# Patient Record
Sex: MALE | Race: WHITE | NOT HISPANIC OR LATINO | URBAN - METROPOLITAN AREA
[De-identification: names, ages, dates, MRNs, and addresses within clinical notes are randomized per-mention and may not be internally consistent; named-entity substitution may affect disease eponyms.]

---

## 2023-10-12 ENCOUNTER — TELEPHONE (OUTPATIENT)
Dept: VASCULAR SURGERY | Facility: CLINIC | Age: 78
End: 2023-10-12

## 2023-10-12 NOTE — TELEPHONE ENCOUNTER
Left message for the pt to offer clinic appt for OCT. 25 @ 11pm with Dr. NESHA Betancur. Sent confirmation to Mary Bear with Dr. TOSIN Pavon [facility] at the [facility]. She is sending the pt disk.

## 2023-10-23 DIAGNOSIS — I71.23 ANEURYSM OF DESCENDING THORACIC AORTA WITHOUT RUPTURE: Primary | ICD-10-CM

## 2023-10-23 DIAGNOSIS — I77.1 STRICTURE OF ARTERY: ICD-10-CM

## 2023-10-25 ENCOUNTER — HOSPITAL ENCOUNTER (OUTPATIENT)
Dept: VASCULAR SURGERY | Facility: CLINIC | Age: 78
Discharge: HOME | End: 2023-10-25
Attending: SURGERY
Payer: MEDICARE

## 2023-10-25 ENCOUNTER — INITIAL CONSULT (OUTPATIENT)
Dept: VASCULAR SURGERY | Facility: CLINIC | Age: 78
End: 2023-10-25
Payer: MEDICARE

## 2023-10-25 VITALS
HEIGHT: 69 IN | BODY MASS INDEX: 26.77 KG/M2 | DIASTOLIC BLOOD PRESSURE: 78 MMHG | TEMPERATURE: 98 F | HEART RATE: 73 BPM | WEIGHT: 180.75 LBS | SYSTOLIC BLOOD PRESSURE: 149 MMHG

## 2023-10-25 DIAGNOSIS — I65.23 BILATERAL CAROTID ARTERY STENOSIS: Primary | ICD-10-CM

## 2023-10-25 DIAGNOSIS — I73.9 PAD (PERIPHERAL ARTERY DISEASE): Primary | ICD-10-CM

## 2023-10-25 DIAGNOSIS — I71.23 ANEURYSM OF DESCENDING THORACIC AORTA WITHOUT RUPTURE: ICD-10-CM

## 2023-10-25 DIAGNOSIS — I71.23 ANEURYSM OF DESCENDING THORACIC AORTA WITHOUT RUPTURE: Primary | ICD-10-CM

## 2023-10-25 DIAGNOSIS — I77.1 STRICTURE OF ARTERY: ICD-10-CM

## 2023-10-25 PROCEDURE — 93925 LOWER EXTREMITY STUDY: CPT | Mod: 52,PBBFAC | Performed by: SURGERY

## 2023-10-25 PROCEDURE — 93923 UPR/LXTR ART STDY 3+ LVLS: CPT | Mod: 26,S$PBB,, | Performed by: SURGERY

## 2023-10-25 PROCEDURE — 93880 EXTRACRANIAL BILAT STUDY: CPT | Mod: PBBFAC | Performed by: SURGERY

## 2023-10-25 PROCEDURE — 93880 PR DUPLEX SCAN EXTRACRANIAL,BILAT: ICD-10-PCS | Mod: 26,S$PBB,, | Performed by: SURGERY

## 2023-10-25 PROCEDURE — 93923 PR NON-INVASIVE PHYSIOLOGIC STUDY EXTREMITY 3 LEVELS: ICD-10-PCS | Mod: 26,S$PBB,, | Performed by: SURGERY

## 2023-10-25 PROCEDURE — 99213 OFFICE O/P EST LOW 20 MIN: CPT | Mod: PBBFAC | Performed by: SURGERY

## 2023-10-25 PROCEDURE — 99999 PR PBB SHADOW E&M-EST. PATIENT-LVL III: CPT | Mod: PBBFAC,,, | Performed by: SURGERY

## 2023-10-25 PROCEDURE — 99205 PR OFFICE/OUTPT VISIT, NEW, LEVL V, 60-74 MIN: ICD-10-PCS | Mod: S$PBB,,, | Performed by: SURGERY

## 2023-10-25 PROCEDURE — 93880 EXTRACRANIAL BILAT STUDY: CPT | Mod: 26,S$PBB,, | Performed by: SURGERY

## 2023-10-25 PROCEDURE — 93925 LOWER EXTREMITY STUDY: CPT | Mod: 26,52,S$PBB, | Performed by: SURGERY

## 2023-10-25 PROCEDURE — 93923 UPR/LXTR ART STDY 3+ LVLS: CPT | Mod: PBBFAC | Performed by: SURGERY

## 2023-10-25 PROCEDURE — 99999 PR PBB SHADOW E&M-EST. PATIENT-LVL III: ICD-10-PCS | Mod: PBBFAC,,, | Performed by: SURGERY

## 2023-10-25 PROCEDURE — 93925 PR DUPLEX LO EXTREM ART BILAT: ICD-10-PCS | Mod: 26,52,S$PBB, | Performed by: SURGERY

## 2023-10-25 PROCEDURE — 99205 OFFICE O/P NEW HI 60 MIN: CPT | Mod: S$PBB,,, | Performed by: SURGERY

## 2023-10-25 RX ORDER — ZINC GLUCONATE 50 MG
50 TABLET ORAL DAILY
COMMUNITY

## 2023-10-25 RX ORDER — CLOPIDOGREL BISULFATE 75 MG/1
75 TABLET ORAL
COMMUNITY
Start: 2023-09-18

## 2023-10-25 RX ORDER — ASPIRIN 81 MG/1
81 TABLET ORAL DAILY
COMMUNITY

## 2023-10-25 RX ORDER — AMLODIPINE BESYLATE 5 MG/1
5 TABLET ORAL
COMMUNITY
Start: 2023-07-26

## 2023-10-25 RX ORDER — ROSUVASTATIN CALCIUM 40 MG/1
40 TABLET, COATED ORAL
COMMUNITY
Start: 2023-10-02

## 2023-10-25 RX ORDER — METOPROLOL TARTRATE 25 MG/1
25 TABLET, FILM COATED ORAL
COMMUNITY
Start: 2023-07-26

## 2023-10-25 RX ORDER — CHOLECALCIFEROL (VITAMIN D3) 25 MCG
1000 TABLET ORAL DAILY
COMMUNITY

## 2023-10-25 RX ORDER — POTASSIUM CHLORIDE 750 MG/1
10 TABLET, EXTENDED RELEASE ORAL ONCE
COMMUNITY

## 2023-10-25 RX ORDER — LOSARTAN POTASSIUM AND HYDROCHLOROTHIAZIDE 25; 100 MG/1; MG/1
1 TABLET ORAL
COMMUNITY
Start: 2023-08-21

## 2023-10-25 NOTE — PROGRESS NOTES
Chucky Daniels  10/25/2023    HPI:  Patient is a 77 y.o. male with a h/o extensive tobacco use (>50 pack yrs), HTN, HLD and known PAD s/p R LACIE and EIA PTAS ([place], [place]) who is here today for evaluation of a mid-thoracic aortic aneurysm - found after screening CT in 2018. He was being followed by [facility] in [place], [place] and PMD found recent growth to 6cm TAA.  Per wife, when R LACIE, EIA PTAS was done, the L iliac was attempted by could not be crossed.    Able to walk 1 flight of stairs without any angina or PRESSLEY - limited by hip OA    No MI/stroke  Tobacco use: > 50 pack; quit 2008  FHx for aneurysmal disease: father Dx age 88 yo    Asked to see by Dr Jordan Montana - [facility] in [place], [place]  PMD is Dr JUDITH Mcmahan, Primary Doctor   Employment: Ret[employer]      Current Outpatient Medications:     amLODIPine (NORVASC) 5 MG tablet, Take 5 mg by mouth., Disp: , Rfl:     aspirin (ECOTRIN) 81 MG EC tablet, Take 81 mg by mouth once daily., Disp: , Rfl:     berberine/herbal complex no.18 (BERBERINE-HERBAL COMB NO.18 ORAL), Take by mouth., Disp: , Rfl:     clopidogreL (PLAVIX) 75 mg tablet, Take 75 mg by mouth., Disp: , Rfl:     IRON HEME POLYPEPTIDE ORAL, Take by mouth., Disp: , Rfl:     Lactobac no.41/Bifidobact no.7 (PROBIOTIC-10 ORAL), Take by mouth., Disp: , Rfl:     LIDOcaine HCL-menthoL 4-1 % Crea, Apply topically., Disp: , Rfl:     losartan-hydrochlorothiazide 100-25 mg (HYZAAR) 100-25 mg per tablet, Take 1 tablet by mouth., Disp: , Rfl:     metoprolol tartrate (LOPRESSOR) 25 MG tablet, Take 25 mg by mouth., Disp: , Rfl:     multivitamin capsule, Take 1 capsule by mouth once daily., Disp: , Rfl:     potassium chloride (KLOR-CON) 10 MEQ TbSR, Take 10 mEq by mouth once., Disp: , Rfl:     rosuvastatin (CRESTOR) 40 MG Tab, Take 40 mg by mouth., Disp: , Rfl:     vit A/vit C/vit E/zinc/copper (ICAPS AREDS ORAL), Take by mouth., Disp: , Rfl:     vitamin D (VITAMIN D3) 1000 units Tab, Take 1,000 Units by  "mouth once daily., Disp: , Rfl:     zinc gluconate 50 mg tablet, Take 50 mg by mouth once daily., Disp: , Rfl:     PHYSICAL EXAM:   Right Arm BP - Sittin/78 (10/25/23 1019)  Left Arm BP - Sittin/80 (10/25/23 1019)  Pulse: 73  Temp: 98.1 °F (36.7 °C)                   Neck: No carotid bruit can be auscultated          Abdomen: Soft, nontender, nondistended, no masses or organomegaly     No rebound tenderness noted; bowel sounds normal     Pulsatile aortic mass is not palpable.     No groin adenopathy      Extremities:   2+ R; no L femoral pulses bilaterally     2+ R DP pulses palpable; not on L foot     No pedal edema; No ulcerations    LAB RESULTS:  No results found for: "K", "CREATININE"  No results found for: "WBC", "HCT", "PLT"  No results found for: "HGBA1C"    IMAGING (I have independently reviewed and interpreted the following tests):  Outside CT ch/a/p (in epic): mid-thoracic aortic aneurysm, 6cm  Extensive calcification aorta into iliacs x2  Evidence of prior R LACIE and EIA stents - both have lumens 5mm    ABIs  R 1.04  L 0.59    Popliteal u/s: no aneurysms    Carotid u/s < 39% stenoses x2    IMP/PLAN:     77 y.o. male with a h/o extensive tobacco use (>50 pack yrs), HTN, HLD and known PAD s/p R LACIE and EIA PTAS ([place], [place]) with a 6cm mid-thoracic aneurysm and heavily calcified aorto-iliac arteries    Outside CT ch/a/p (in epic): mid-thoracic aortic aneurysm, 6cm  Extensive calcification aorta into iliacs x2  Evidence of prior R LACIE and EIA stents - both have lumens 5mm and the R EIA stent appears to be self-expanding, precluding further luminal gain with aggressive PTA/S  I spent > 1.5hrs looking at the CT to determine ways to be able to deliver a thoracic stent graft device for repair of the 6cm TAA. I even contemplated going thru the L axillary or L CCA - retrograde - but vessel size is not large enough, arch is heavily calcified increasing risk of stroke.  Further, the new [place] C-Tag " opens from tip to hub (50%) and then hub to tip -- no longer from mid-section.  Hence, there is no option for a TEVAR.    Perhaps in the future, there may be improvement in an available TEVAR with markedly lower profile to allow passage of 31 - 32mm TEVAR through a 5.5mm R LACIE/EIA lumen  Should it grow fast and > 7.5cm, we would then entertain open repair with atrio-femoral bypass -- but he agrees to not do this at this time.  F/u in 1 yr with new CT chest/abd/pelvis        Jose Juan Betancur MD DFS FACS   Vascular/Endovascular Surgery    Time spent personally reviewing the patient's chart, interpreting images and test, and conferring with physicians was HBtimespent2: 60 mins.

## 2024-10-15 DIAGNOSIS — I71.23 ANEURYSM OF DESCENDING THORACIC AORTA WITHOUT RUPTURE: Primary | ICD-10-CM

## 2024-12-04 ENCOUNTER — OFFICE VISIT (OUTPATIENT)
Dept: VASCULAR SURGERY | Facility: CLINIC | Age: 79
End: 2024-12-04
Payer: MEDICARE

## 2024-12-04 ENCOUNTER — HOSPITAL ENCOUNTER (OUTPATIENT)
Dept: RADIOLOGY | Facility: HOSPITAL | Age: 79
Discharge: HOME | End: 2024-12-04
Attending: SURGERY
Payer: MEDICARE

## 2024-12-04 VITALS
SYSTOLIC BLOOD PRESSURE: 166 MMHG | DIASTOLIC BLOOD PRESSURE: 74 MMHG | HEIGHT: 69 IN | BODY MASS INDEX: 24.82 KG/M2 | HEART RATE: 76 BPM | TEMPERATURE: 98 F | WEIGHT: 167.56 LBS

## 2024-12-04 DIAGNOSIS — I71.23 ANEURYSM OF DESCENDING THORACIC AORTA WITHOUT RUPTURE: Primary | ICD-10-CM

## 2024-12-04 DIAGNOSIS — I73.9 PAD (PERIPHERAL ARTERY DISEASE): ICD-10-CM

## 2024-12-04 DIAGNOSIS — I71.23 ANEURYSM OF DESCENDING THORACIC AORTA WITHOUT RUPTURE: ICD-10-CM

## 2024-12-04 PROCEDURE — 99999 PR PBB SHADOW E&M-EST. PATIENT-LVL III: CPT | Mod: PBBFAC,,, | Performed by: SURGERY

## 2024-12-04 PROCEDURE — 74176 CT ABD & PELVIS W/O CONTRAST: CPT | Mod: 26,,, | Performed by: RADIOLOGY

## 2024-12-04 PROCEDURE — 71250 CT THORAX DX C-: CPT | Mod: 26,,, | Performed by: RADIOLOGY

## 2024-12-04 PROCEDURE — 74176 CT ABD & PELVIS W/O CONTRAST: CPT | Mod: TC

## 2024-12-04 PROCEDURE — 99214 OFFICE O/P EST MOD 30 MIN: CPT | Mod: S$PBB,,, | Performed by: SURGERY

## 2024-12-04 PROCEDURE — 99213 OFFICE O/P EST LOW 20 MIN: CPT | Mod: PBBFAC,25 | Performed by: SURGERY

## 2024-12-04 NOTE — PROGRESS NOTES
Chucky Daniels  12/04/2024    HPI:  Patient is a 78 y.o. male with a h/o extensive tobacco use (>50 pack yrs), HTN, HLD and known PAD s/p R LACIE and EIA PTAS ([place], [place]) who is here today for follow up of PAD and a mid-thoracic aortic aneurysm - found after screening CT in 2018. He was being followed by [facility] in [place], [place] and PMD found recent growth to 6cm TAA.  Per wife, when R LACIE, EIA PTAS was done, the L iliac was attempted by could not be crossed.    Able to walk 1 flight of stairs without any angina or PRESSLEY - limited by hip OA. He has been going to the gym and walking on the treadmill. He walks for 20 minutes total, going for 2 minutes at a time.    No MI/stroke  Tobacco use: > 50 pack; quit 2008  FHx for aneurysmal disease: father Dx age 90 yo    Asked to see by Dr Jordan Montana - [facility] in [place], [place]  PMD is Dr JUDITH Mcmahan, Primary Doctor   Employment: Ret[employer]      Current Outpatient Medications:     amLODIPine (NORVASC) 5 MG tablet, Take 5 mg by mouth., Disp: , Rfl:     aspirin (ECOTRIN) 81 MG EC tablet, Take 81 mg by mouth once daily., Disp: , Rfl:     berberine/herbal complex no.18 (BERBERINE-HERBAL COMB NO.18 ORAL), Take by mouth., Disp: , Rfl:     clopidogreL (PLAVIX) 75 mg tablet, Take 75 mg by mouth., Disp: , Rfl:     IRON HEME POLYPEPTIDE ORAL, Take by mouth., Disp: , Rfl:     Lactobac no.41/Bifidobact no.7 (PROBIOTIC-10 ORAL), Take by mouth., Disp: , Rfl:     LIDOcaine HCL-menthoL 4-1 % Crea, Apply topically., Disp: , Rfl:     losartan-hydrochlorothiazide 100-25 mg (HYZAAR) 100-25 mg per tablet, Take 1 tablet by mouth., Disp: , Rfl:     metoprolol tartrate (LOPRESSOR) 25 MG tablet, Take 25 mg by mouth., Disp: , Rfl:     multivitamin capsule, Take 1 capsule by mouth once daily., Disp: , Rfl:     potassium chloride (KLOR-CON) 10 MEQ TbSR, Take 10 mEq by mouth once., Disp: , Rfl:     rosuvastatin (CRESTOR) 40 MG Tab, Take 40 mg by mouth., Disp: , Rfl:     vit A/vit C/vit  "E/zinc/copper (ICAPS AREDS ORAL), Take by mouth., Disp: , Rfl:     vitamin D (VITAMIN D3) 1000 units Tab, Take 1,000 Units by mouth once daily., Disp: , Rfl:     zinc gluconate 50 mg tablet, Take 50 mg by mouth once daily., Disp: , Rfl:     PHYSICAL EXAM:   Right Arm BP - Sittin/74 (24 1347)  Left Arm BP - Sittin/80 (24 1347)  Pulse: 76  Temp: 98.1 °F (36.7 °C)                   Neck: No carotid bruit can be auscultated          Abdomen: Soft, nontender, nondistended, no masses or organomegaly     No rebound tenderness noted; bowel sounds normal     Pulsatile aortic mass is not palpable.     No groin adenopathy      Extremities:   2+ R; no L femoral pulses bilaterally     2+ R DP pulses palpable; not on L foot     No pedal edema; No ulcerations    LAB RESULTS:  No results found for: "K", "CREATININE"  No results found for: "WBC", "HCT", "PLT"  Lab Results   Component Value Date    HGBA1C 5.6 10/04/2023    HGBA1C 5.9 05/15/2023    HGBA1C 5.4 10/27/2022       IMAGING (I have independently reviewed and interpreted the following tests):    Dec 2024, CT chest/a/p (no contrast): 6.1cm mid-thoracic aneurysm    Prior:  , Outside CT ch/a/p (in epic): mid-thoracic aortic aneurysm, 6cm  Extensive calcification aorta into iliacs x2  Evidence of prior R LACIE and EIA stents - both have lumens 5mm    ABIs  R 1.04  L 0.59    Popliteal u/s: no aneurysms    Carotid u/s < 39% stenoses x2    IMP/PLAN:     78 y.o. male with a h/o extensive tobacco use (>50 pack yrs), HTN, HLD and known PAD s/p R LACIE and EIA PTAS ([place], [place]) with a 6.1cm (to my read) mid-thoracic aortic aneurysm and heavily calcified aorto-iliac arteries    Cr 1.4 ()    As seen before, he has extensive calcification aorta into iliacs x2  On  outside CT, he has evidence of prior R LACIE and EIA stents - both have lumens 5mm and the R EIA stent appears to be self-expanding, precluding further luminal gain with aggressive " PTA/S    Looking at the CT to determine ways to be able to deliver a thoracic stent graft device for repair of the 6.1 cm TAA, including thru the L axillary or L CCA - retrograde - but vessel size is not large enough, arch is heavily calcified increasing risk of stroke.  Further, the new [place] C-Tag opens from tip to hub (50%) and then hub to tip -- no longer from mid-section. Best potential feasible option for a TEVAR is Cook Zenith alpha though there is a risk it would not traverse iliacs.    Perhaps in the future, there may be improvement in an available TEVAR with markedly lower profile to allow passage of 31 - 32mm TEVAR through a 5.5mm R LACIE/EIA lumen  Should it grow fast and > 7.5cm, we would then entertain open repair with atrio-femoral bypass -- but he agrees to not do this at this time.  F/u in 1 yr with new CT chest/abd/pelvis non-contrast and ABIs       Jose Juan Betancur MD DFSVS FACS   Vascular/Endovascular Surgery    Time spent personally reviewing the patient's chart, interpreting images and test, and conferring with physicians was HBtimespent2: 60 mins.

## 2025-06-11 ENCOUNTER — PATIENT MESSAGE (OUTPATIENT)
Dept: VASCULAR SURGERY | Facility: CLINIC | Age: 80
End: 2025-06-11
Payer: MEDICARE